# Patient Record
Sex: FEMALE | Race: OTHER | Employment: UNEMPLOYED | URBAN - METROPOLITAN AREA
[De-identification: names, ages, dates, MRNs, and addresses within clinical notes are randomized per-mention and may not be internally consistent; named-entity substitution may affect disease eponyms.]

---

## 2023-03-15 ENCOUNTER — HOSPITAL ENCOUNTER (EMERGENCY)
Facility: HOSPITAL | Age: 38
Discharge: HOME | End: 2023-03-15
Payer: COMMERCIAL

## 2023-03-15 ENCOUNTER — APPOINTMENT (OUTPATIENT)
Dept: ULTRASOUND IMAGING | Facility: HOSPITAL | Age: 38
End: 2023-03-15
Attending: NURSE PRACTITIONER
Payer: COMMERCIAL

## 2023-03-15 VITALS
RESPIRATION RATE: 18 BRPM | HEIGHT: 59 IN | BODY MASS INDEX: 23.18 KG/M2 | HEART RATE: 73 BPM | OXYGEN SATURATION: 98 % | DIASTOLIC BLOOD PRESSURE: 79 MMHG | TEMPERATURE: 98 F | WEIGHT: 115 LBS | SYSTOLIC BLOOD PRESSURE: 110 MMHG

## 2023-03-15 DIAGNOSIS — O20.9 BLEEDING IN EARLY PREGNANCY: Primary | ICD-10-CM

## 2023-03-15 LAB
ANION GAP SERPL CALC-SCNC: 9 MMOL/L (ref 0–18)
B-HCG SERPL-ACNC: 270 MIU/ML
BASOPHILS # BLD AUTO: 0.02 X10(3) UL (ref 0–0.2)
BASOPHILS NFR BLD AUTO: 0.3 %
BILIRUB UR QL: NEGATIVE
BUN BLD-MCNC: 12 MG/DL (ref 7–18)
BUN/CREAT SERPL: 18.8 (ref 10–20)
CALCIUM BLD-MCNC: 9.7 MG/DL (ref 8.5–10.1)
CHLORIDE SERPL-SCNC: 106 MMOL/L (ref 98–112)
CO2 SERPL-SCNC: 25 MMOL/L (ref 21–32)
COLOR UR: YELLOW
CREAT BLD-MCNC: 0.64 MG/DL
DEPRECATED RDW RBC AUTO: 39.5 FL (ref 35.1–46.3)
EOSINOPHIL # BLD AUTO: 0.06 X10(3) UL (ref 0–0.7)
EOSINOPHIL NFR BLD AUTO: 0.8 %
ERYTHROCYTE [DISTWIDTH] IN BLOOD BY AUTOMATED COUNT: 12.3 % (ref 11–15)
GFR SERPLBLD BASED ON 1.73 SQ M-ARVRAT: 117 ML/MIN/1.73M2 (ref 60–?)
GLUCOSE BLD-MCNC: 96 MG/DL (ref 70–99)
GLUCOSE UR-MCNC: NEGATIVE MG/DL
HCT VFR BLD AUTO: 36.4 %
HGB BLD-MCNC: 12.3 G/DL
IMM GRANULOCYTES # BLD AUTO: 0.02 X10(3) UL (ref 0–1)
IMM GRANULOCYTES NFR BLD: 0.3 %
LYMPHOCYTES # BLD AUTO: 2.32 X10(3) UL (ref 1–4)
LYMPHOCYTES NFR BLD AUTO: 29.8 %
MCH RBC QN AUTO: 29.4 PG (ref 26–34)
MCHC RBC AUTO-ENTMCNC: 33.8 G/DL (ref 31–37)
MCV RBC AUTO: 87.1 FL
MONOCYTES # BLD AUTO: 0.68 X10(3) UL (ref 0.1–1)
MONOCYTES NFR BLD AUTO: 8.7 %
NEUTROPHILS # BLD AUTO: 4.69 X10 (3) UL (ref 1.5–7.7)
NEUTROPHILS # BLD AUTO: 4.69 X10(3) UL (ref 1.5–7.7)
NEUTROPHILS NFR BLD AUTO: 60.1 %
NITRITE UR QL STRIP.AUTO: NEGATIVE
OSMOLALITY SERPL CALC.SUM OF ELEC: 290 MOSM/KG (ref 275–295)
PH UR: 6 [PH] (ref 5–8)
PLATELET # BLD AUTO: 270 10(3)UL (ref 150–450)
POTASSIUM SERPL-SCNC: 3.9 MMOL/L (ref 3.5–5.1)
PROT UR-MCNC: NEGATIVE MG/DL
RBC # BLD AUTO: 4.18 X10(6)UL
RH BLOOD TYPE: POSITIVE
SODIUM SERPL-SCNC: 140 MMOL/L (ref 136–145)
SP GR UR STRIP: 1.01 (ref 1–1.03)
UROBILINOGEN UR STRIP-ACNC: <2
VIT C UR-MCNC: NEGATIVE MG/DL
WBC # BLD AUTO: 7.8 X10(3) UL (ref 4–11)

## 2023-03-15 PROCEDURE — 87491 CHLMYD TRACH DNA AMP PROBE: CPT | Performed by: NURSE PRACTITIONER

## 2023-03-15 PROCEDURE — 36415 COLL VENOUS BLD VENIPUNCTURE: CPT

## 2023-03-15 PROCEDURE — 86901 BLOOD TYPING SEROLOGIC RH(D): CPT

## 2023-03-15 PROCEDURE — 84702 CHORIONIC GONADOTROPIN TEST: CPT | Performed by: NURSE PRACTITIONER

## 2023-03-15 PROCEDURE — 76801 OB US < 14 WKS SINGLE FETUS: CPT | Performed by: NURSE PRACTITIONER

## 2023-03-15 PROCEDURE — 99284 EMERGENCY DEPT VISIT MOD MDM: CPT

## 2023-03-15 PROCEDURE — 99285 EMERGENCY DEPT VISIT HI MDM: CPT

## 2023-03-15 PROCEDURE — 80048 BASIC METABOLIC PNL TOTAL CA: CPT

## 2023-03-15 PROCEDURE — 86900 BLOOD TYPING SEROLOGIC ABO: CPT

## 2023-03-15 PROCEDURE — 81001 URINALYSIS AUTO W/SCOPE: CPT

## 2023-03-15 PROCEDURE — 87591 N.GONORRHOEAE DNA AMP PROB: CPT | Performed by: NURSE PRACTITIONER

## 2023-03-15 PROCEDURE — 76817 TRANSVAGINAL US OBSTETRIC: CPT | Performed by: NURSE PRACTITIONER

## 2023-03-15 PROCEDURE — 87086 URINE CULTURE/COLONY COUNT: CPT

## 2023-03-15 PROCEDURE — 85025 COMPLETE CBC W/AUTO DIFF WBC: CPT

## 2023-03-15 RX ORDER — CEPHALEXIN 500 MG/1
500 CAPSULE ORAL 2 TIMES DAILY
Qty: 14 CAPSULE | Refills: 0 | Status: SHIPPED | OUTPATIENT
Start: 2023-03-15 | End: 2023-03-22

## 2023-03-15 NOTE — ED INITIAL ASSESSMENT (HPI)
Pt c/o bleeding that began 3 days ago. Pt took preg test that was + 3 weeks ago. Pt states she began having cramps 3 days ago along with the bleeding. Pt states she had a vaginal infection before getting pregnant that was producing discharge.

## 2023-03-16 LAB
C TRACH DNA SPEC QL NAA+PROBE: NEGATIVE
N GONORRHOEA DNA SPEC QL NAA+PROBE: NEGATIVE

## 2023-03-22 ENCOUNTER — ANESTHESIA EVENT (OUTPATIENT)
Dept: SURGERY | Facility: HOSPITAL | Age: 38
End: 2023-03-22
Payer: COMMERCIAL

## 2023-03-22 ENCOUNTER — APPOINTMENT (OUTPATIENT)
Dept: ULTRASOUND IMAGING | Facility: HOSPITAL | Age: 38
End: 2023-03-22
Attending: EMERGENCY MEDICINE
Payer: COMMERCIAL

## 2023-03-22 ENCOUNTER — ANESTHESIA (OUTPATIENT)
Dept: SURGERY | Facility: HOSPITAL | Age: 38
End: 2023-03-22
Payer: COMMERCIAL

## 2023-03-22 ENCOUNTER — TELEPHONE (OUTPATIENT)
Dept: OBGYN CLINIC | Facility: CLINIC | Age: 38
End: 2023-03-22

## 2023-03-22 ENCOUNTER — HOSPITAL ENCOUNTER (EMERGENCY)
Facility: HOSPITAL | Age: 38
Discharge: HOME | End: 2023-03-22
Attending: EMERGENCY MEDICINE
Payer: COMMERCIAL

## 2023-03-22 VITALS
RESPIRATION RATE: 16 BRPM | BODY MASS INDEX: 26.32 KG/M2 | TEMPERATURE: 98 F | HEART RATE: 91 BPM | DIASTOLIC BLOOD PRESSURE: 61 MMHG | WEIGHT: 122 LBS | SYSTOLIC BLOOD PRESSURE: 107 MMHG | OXYGEN SATURATION: 94 % | HEIGHT: 57 IN

## 2023-03-22 DIAGNOSIS — O00.102 LEFT TUBAL PREGNANCY WITHOUT INTRAUTERINE PREGNANCY: Primary | ICD-10-CM

## 2023-03-22 LAB
ALBUMIN SERPL-MCNC: 4 G/DL (ref 3.4–5)
ALBUMIN/GLOB SERPL: 1.1 {RATIO} (ref 1–2)
ALP LIVER SERPL-CCNC: 65 U/L
ALT SERPL-CCNC: 23 U/L
ANION GAP SERPL CALC-SCNC: 10 MMOL/L (ref 0–18)
ANTIBODY SCREEN: NEGATIVE
AST SERPL-CCNC: 24 U/L (ref 15–37)
B-HCG SERPL-ACNC: 800 MIU/ML
B-HCG UR QL: POSITIVE
BASOPHILS # BLD AUTO: 0.02 X10(3) UL (ref 0–0.2)
BASOPHILS NFR BLD AUTO: 0.2 %
BILIRUB SERPL-MCNC: 0.3 MG/DL (ref 0.1–2)
BILIRUB UR QL: NEGATIVE
BUN BLD-MCNC: 15 MG/DL (ref 7–18)
BUN/CREAT SERPL: 23.4 (ref 10–20)
CALCIUM BLD-MCNC: 9 MG/DL (ref 8.5–10.1)
CHLORIDE SERPL-SCNC: 105 MMOL/L (ref 98–112)
CO2 SERPL-SCNC: 24 MMOL/L (ref 21–32)
COLOR UR: YELLOW
CREAT BLD-MCNC: 0.64 MG/DL
DEPRECATED RDW RBC AUTO: 40.3 FL (ref 35.1–46.3)
EOSINOPHIL # BLD AUTO: 0.01 X10(3) UL (ref 0–0.7)
EOSINOPHIL NFR BLD AUTO: 0.1 %
ERYTHROCYTE [DISTWIDTH] IN BLOOD BY AUTOMATED COUNT: 12.4 % (ref 11–15)
GFR SERPLBLD BASED ON 1.73 SQ M-ARVRAT: 117 ML/MIN/1.73M2 (ref 60–?)
GLOBULIN PLAS-MCNC: 3.6 G/DL (ref 2.8–4.4)
GLUCOSE BLD-MCNC: 111 MG/DL (ref 70–99)
GLUCOSE UR-MCNC: NEGATIVE MG/DL
HCT VFR BLD AUTO: 33.2 %
HGB BLD-MCNC: 10.9 G/DL
HGB UR QL STRIP.AUTO: NEGATIVE
IMM GRANULOCYTES # BLD AUTO: 0.05 X10(3) UL (ref 0–1)
IMM GRANULOCYTES NFR BLD: 0.5 %
LYMPHOCYTES # BLD AUTO: 0.66 X10(3) UL (ref 1–4)
LYMPHOCYTES NFR BLD AUTO: 6.6 %
MCH RBC QN AUTO: 29.3 PG (ref 26–34)
MCHC RBC AUTO-ENTMCNC: 32.8 G/DL (ref 31–37)
MCV RBC AUTO: 89.2 FL
MONOCYTES # BLD AUTO: 0.35 X10(3) UL (ref 0.1–1)
MONOCYTES NFR BLD AUTO: 3.5 %
NEUTROPHILS # BLD AUTO: 8.88 X10 (3) UL (ref 1.5–7.7)
NEUTROPHILS # BLD AUTO: 8.88 X10(3) UL (ref 1.5–7.7)
NEUTROPHILS NFR BLD AUTO: 89.1 %
NITRITE UR QL STRIP.AUTO: NEGATIVE
OSMOLALITY SERPL CALC.SUM OF ELEC: 290 MOSM/KG (ref 275–295)
PH UR: 5 [PH] (ref 5–8)
PLATELET # BLD AUTO: 246 10(3)UL (ref 150–450)
POTASSIUM SERPL-SCNC: 3.8 MMOL/L (ref 3.5–5.1)
PROT SERPL-MCNC: 7.6 G/DL (ref 6.4–8.2)
PROT UR-MCNC: NEGATIVE MG/DL
RBC # BLD AUTO: 3.72 X10(6)UL
RH BLOOD TYPE: POSITIVE
RH BLOOD TYPE: POSITIVE
SARS-COV-2 RNA RESP QL NAA+PROBE: NOT DETECTED
SODIUM SERPL-SCNC: 139 MMOL/L (ref 136–145)
SP GR UR STRIP: 1.03 (ref 1–1.03)
UROBILINOGEN UR STRIP-ACNC: <2
VIT C UR-MCNC: 40 MG/DL
WBC # BLD AUTO: 10 X10(3) UL (ref 4–11)

## 2023-03-22 PROCEDURE — 10T24ZZ RESECTION OF PRODUCTS OF CONCEPTION, ECTOPIC, PERCUTANEOUS ENDOSCOPIC APPROACH: ICD-10-PCS | Performed by: OBSTETRICS & GYNECOLOGY

## 2023-03-22 PROCEDURE — 76817 TRANSVAGINAL US OBSTETRIC: CPT | Performed by: EMERGENCY MEDICINE

## 2023-03-22 PROCEDURE — 59150 TREAT ECTOPIC PREGNANCY: CPT | Performed by: OBSTETRICS & GYNECOLOGY

## 2023-03-22 PROCEDURE — 0UT54ZZ RESECTION OF RIGHT FALLOPIAN TUBE, PERCUTANEOUS ENDOSCOPIC APPROACH: ICD-10-PCS | Performed by: OBSTETRICS & GYNECOLOGY

## 2023-03-22 PROCEDURE — 76801 OB US < 14 WKS SINGLE FETUS: CPT | Performed by: EMERGENCY MEDICINE

## 2023-03-22 RX ORDER — HYDROMORPHONE HYDROCHLORIDE 1 MG/ML
0.4 INJECTION, SOLUTION INTRAMUSCULAR; INTRAVENOUS; SUBCUTANEOUS EVERY 5 MIN PRN
Status: DISCONTINUED | OUTPATIENT
Start: 2023-03-22 | End: 2023-03-22

## 2023-03-22 RX ORDER — MAGNESIUM HYDROXIDE 1200 MG/15ML
LIQUID ORAL CONTINUOUS PRN
Status: COMPLETED | OUTPATIENT
Start: 2023-03-22 | End: 2023-03-22

## 2023-03-22 RX ORDER — ROCURONIUM BROMIDE 10 MG/ML
INJECTION, SOLUTION INTRAVENOUS AS NEEDED
Status: DISCONTINUED | OUTPATIENT
Start: 2023-03-22 | End: 2023-03-22 | Stop reason: SURG

## 2023-03-22 RX ORDER — HYDROMORPHONE HYDROCHLORIDE 1 MG/ML
0.6 INJECTION, SOLUTION INTRAMUSCULAR; INTRAVENOUS; SUBCUTANEOUS EVERY 5 MIN PRN
Status: DISCONTINUED | OUTPATIENT
Start: 2023-03-22 | End: 2023-03-22

## 2023-03-22 RX ORDER — LIDOCAINE HYDROCHLORIDE 10 MG/ML
INJECTION, SOLUTION EPIDURAL; INFILTRATION; INTRACAUDAL; PERINEURAL AS NEEDED
Status: DISCONTINUED | OUTPATIENT
Start: 2023-03-22 | End: 2023-03-22 | Stop reason: SURG

## 2023-03-22 RX ORDER — NEOSTIGMINE METHYLSULFATE 1 MG/ML
INJECTION, SOLUTION INTRAVENOUS AS NEEDED
Status: DISCONTINUED | OUTPATIENT
Start: 2023-03-22 | End: 2023-03-22 | Stop reason: SURG

## 2023-03-22 RX ORDER — MORPHINE SULFATE 4 MG/ML
4 INJECTION, SOLUTION INTRAMUSCULAR; INTRAVENOUS ONCE
Status: COMPLETED | OUTPATIENT
Start: 2023-03-22 | End: 2023-03-22

## 2023-03-22 RX ORDER — IBUPROFEN 600 MG/1
600 TABLET ORAL EVERY 6 HOURS PRN
Qty: 30 TABLET | Refills: 0 | Status: SHIPPED | OUTPATIENT
Start: 2023-03-22

## 2023-03-22 RX ORDER — HYDROMORPHONE HYDROCHLORIDE 1 MG/ML
0.2 INJECTION, SOLUTION INTRAMUSCULAR; INTRAVENOUS; SUBCUTANEOUS EVERY 5 MIN PRN
Status: DISCONTINUED | OUTPATIENT
Start: 2023-03-22 | End: 2023-03-22

## 2023-03-22 RX ORDER — MORPHINE SULFATE 4 MG/ML
4 INJECTION, SOLUTION INTRAMUSCULAR; INTRAVENOUS EVERY 10 MIN PRN
Status: DISCONTINUED | OUTPATIENT
Start: 2023-03-22 | End: 2023-03-22

## 2023-03-22 RX ORDER — HYDROCODONE BITARTRATE AND ACETAMINOPHEN 5; 325 MG/1; MG/1
1-2 TABLET ORAL EVERY 4 HOURS PRN
Qty: 12 TABLET | Refills: 0 | Status: SHIPPED | OUTPATIENT
Start: 2023-03-22 | End: 2023-03-27

## 2023-03-22 RX ORDER — MORPHINE SULFATE 4 MG/ML
2 INJECTION, SOLUTION INTRAMUSCULAR; INTRAVENOUS EVERY 10 MIN PRN
Status: DISCONTINUED | OUTPATIENT
Start: 2023-03-22 | End: 2023-03-22

## 2023-03-22 RX ORDER — MORPHINE SULFATE 10 MG/ML
6 INJECTION, SOLUTION INTRAMUSCULAR; INTRAVENOUS EVERY 10 MIN PRN
Status: DISCONTINUED | OUTPATIENT
Start: 2023-03-22 | End: 2023-03-22

## 2023-03-22 RX ORDER — BUPIVACAINE HYDROCHLORIDE 2.5 MG/ML
INJECTION, SOLUTION EPIDURAL; INFILTRATION; INTRACAUDAL AS NEEDED
Status: DISCONTINUED | OUTPATIENT
Start: 2023-03-22 | End: 2023-03-22 | Stop reason: HOSPADM

## 2023-03-22 RX ORDER — SODIUM CHLORIDE 9 MG/ML
INJECTION, SOLUTION INTRAVENOUS CONTINUOUS PRN
Status: DISCONTINUED | OUTPATIENT
Start: 2023-03-22 | End: 2023-03-22 | Stop reason: SURG

## 2023-03-22 RX ORDER — ONDANSETRON 2 MG/ML
INJECTION INTRAMUSCULAR; INTRAVENOUS AS NEEDED
Status: DISCONTINUED | OUTPATIENT
Start: 2023-03-22 | End: 2023-03-22 | Stop reason: SURG

## 2023-03-22 RX ORDER — IBUPROFEN 600 MG/1
600 TABLET ORAL ONCE
Status: COMPLETED | OUTPATIENT
Start: 2023-03-22 | End: 2023-03-22

## 2023-03-22 RX ORDER — HYDROCODONE BITARTRATE AND ACETAMINOPHEN 5; 325 MG/1; MG/1
1 TABLET ORAL ONCE
Status: DISCONTINUED | OUTPATIENT
Start: 2023-03-22 | End: 2023-03-22

## 2023-03-22 RX ORDER — DEXAMETHASONE SODIUM PHOSPHATE 4 MG/ML
VIAL (ML) INJECTION AS NEEDED
Status: DISCONTINUED | OUTPATIENT
Start: 2023-03-22 | End: 2023-03-22 | Stop reason: SURG

## 2023-03-22 RX ORDER — GLYCOPYRROLATE 0.2 MG/ML
INJECTION, SOLUTION INTRAMUSCULAR; INTRAVENOUS AS NEEDED
Status: DISCONTINUED | OUTPATIENT
Start: 2023-03-22 | End: 2023-03-22 | Stop reason: SURG

## 2023-03-22 RX ORDER — SODIUM CHLORIDE, SODIUM LACTATE, POTASSIUM CHLORIDE, CALCIUM CHLORIDE 600; 310; 30; 20 MG/100ML; MG/100ML; MG/100ML; MG/100ML
INJECTION, SOLUTION INTRAVENOUS CONTINUOUS
Status: DISCONTINUED | OUTPATIENT
Start: 2023-03-22 | End: 2023-03-22

## 2023-03-22 RX ORDER — SODIUM CHLORIDE 9 MG/ML
INJECTION, SOLUTION INTRAVENOUS CONTINUOUS
OUTPATIENT
Start: 2023-03-22 | End: 2023-03-22

## 2023-03-22 RX ORDER — NALOXONE HYDROCHLORIDE 0.4 MG/ML
80 INJECTION, SOLUTION INTRAMUSCULAR; INTRAVENOUS; SUBCUTANEOUS AS NEEDED
Status: DISCONTINUED | OUTPATIENT
Start: 2023-03-22 | End: 2023-03-22

## 2023-03-22 RX ADMIN — DEXAMETHASONE SODIUM PHOSPHATE 4 MG: 4 MG/ML VIAL (ML) INJECTION at 16:19:00

## 2023-03-22 RX ADMIN — GLYCOPYRROLATE 0.6 MG: 0.2 INJECTION, SOLUTION INTRAMUSCULAR; INTRAVENOUS at 17:45:00

## 2023-03-22 RX ADMIN — ROCURONIUM BROMIDE 30 MG: 10 INJECTION, SOLUTION INTRAVENOUS at 16:13:00

## 2023-03-22 RX ADMIN — NEOSTIGMINE METHYLSULFATE 3 MG: 1 INJECTION, SOLUTION INTRAVENOUS at 17:45:00

## 2023-03-22 RX ADMIN — SODIUM CHLORIDE: 9 INJECTION, SOLUTION INTRAVENOUS at 16:13:00

## 2023-03-22 RX ADMIN — ONDANSETRON 4 MG: 2 INJECTION INTRAMUSCULAR; INTRAVENOUS at 16:19:00

## 2023-03-22 RX ADMIN — LIDOCAINE HYDROCHLORIDE 50 MG: 10 INJECTION, SOLUTION EPIDURAL; INFILTRATION; INTRACAUDAL; PERINEURAL at 16:13:00

## 2023-03-22 NOTE — ED QUICK NOTES
Report given to OR RN. Pt instructed to take off all jewelry, clothes including underwear. IV bolus still infusing.

## 2023-03-22 NOTE — OPERATIVE REPORT
Laparoscopic Removal Ectopic Pregnancy Operative Note:      Preoperative Diagnosis: Suspected left adnexal ectopic pregnancy  Postoperative Diagnosis: Same    Procedure: Laparoscopic Removal Ectopic pregnancy with partial left salpingectomy and right salpingectomy    Surgeon: Nereida Dill MD  Assistant: Stacia Lyle MD; Tanesha Bailey MD    Anesthesia: General  Complications: None  EBL:  mL  IV Fluids: 1,000 mL    Findings: Approximately 300 mL blood and clots in the pelvis upon entering. Area at left cornua that was suspicious for ectopic and removed. Right tube with appearance of hydrosalpinx, removed. Procedure:  After informed consent, the patient was taken to the operating room and placed under general anesthesia. The patient was then placed in dorsal lithotomy position and prepared and draped in normal sterile fashion. A time out was held and patient and procedure verified. Attention was then turned to the patient's abdomen where a 5mm skin incision was made in the superior aspect of the umbilical fold. A veress needle was used to enter the abdominal cavity and insufflate the abdomen. A 5mm trocar was them placed through the incision. A survey of the patient's pelvis and abdomen revealed blood in the pelvis with clots measuring approximately 300 mL. There was no definitive source of the ectopic pregnancy. The left fallopian tube appear to be adhered to the side wall and extending retroperitoneal, with portion of tube and possible edge of cornua a suspected source of the ectopic. Right fallopian tube appeared as hydrosalpinx and also suspicious appearing as the possible source of the ectopic. Two addition 5mm ports were placed in the left and right lower quadrants. The left segment of the tube near the cornua was cauterized and cut transecting close to the cornua and removed through the port site. The right tube was grasped and elevated.  The Enseal device was used taking down the tubo-ovarian ligament with special attention paid to the IP to not interrupt blood supply. The tube was transected at the cornua and removed through a side port. Both specimen were sent for frozen section to confirm chorionic villi; frozen section was negative. The pelvis was then irrigated until all clots were removed. A final survey of the pelvis ensured hemostasis. Francisco Javier was added to both adnexa for additional hemostasis. The instruments and trocars were then removed from the pelvis and the abdomen was evacuated of pneumoperitoneum. The skin incisions were closed in subcuticular manner with 4-0 Vicryl. Each incision was clean and covered with Tegaderm. The patient tolerated the procedure well and was taken to recovery in stable condition. The sponge, needle and instrument counts were correct. Plan will be to trend weekly HCG to ensure level decreases since there was no definitive ectopic pregnancy visualized.

## 2023-03-22 NOTE — BRIEF OP NOTE
Pre-Operative Diagnosis: ectopic pregnancy     Post-Operative Diagnosis: ectopic pregnancy      Procedure Performed:   LAPAROSCOPIC TUBAL ECTOPIC EXCISION, right salpingectomy    Surgeon(s) and Role:     Mendel Garcia MD - Primary     * Matthew Tanner MD - Assisting Surgeon     * Rickey Ireland MD - Assisting Surgeon    Assistant(s):        Surgical Findings: Approximately 300 mL blood and clots noted in pelvis upon entering. Partial left salpingectomy performed for suspected location of ectopic. Right hydrosalpinx vs source of ectopic noted.       Specimen: Left partial tube; right fallopian tube; suction contents     Estimated Blood Loss: No data recorded      Jadyn Farrell MD  3/22/2023

## 2023-03-22 NOTE — ED INITIAL ASSESSMENT (HPI)
Pt about 8 weeks pregnant, c/o lower abdominal pain/cramping. Denies bleeding.  LMP;: 2/4/2023 Pt had left fallopian tube/ovary removed 2013

## 2023-03-22 NOTE — ANESTHESIA PROCEDURE NOTES
Airway  Date/Time: 3/22/2023 4:15 PM  Urgency: Elective    Airway not difficult    General Information and Staff    Patient location during procedure: OR  Anesthesiologist: Yobany Martini MD  Performed: anesthesiologist   Performed by: Yobany Martini MD  Authorized by: Yobany Martini MD      Indications and Patient Condition  Indications for airway management: anesthesia  Sedation level: deep  Preoxygenated: yes  Patient position: sniffing  Mask difficulty assessment: 1 - vent by mask    Final Airway Details  Final airway type: endotracheal airway      Successful airway: ETT  Cuffed: yes   Successful intubation technique: direct laryngoscopy  Endotracheal tube insertion site: oral  Blade: Beatrice  Blade size: #3  ETT size (mm): 7.0    Cormack-Lehane Classification: grade I - full view of glottis  Placement verified by: chest auscultation and capnometry   Measured from: gums  ETT to gums (cm): 20  Number of attempts at approach: 1

## 2023-03-22 NOTE — ED QUICK NOTES
Orders for admission, patient is aware of plan and ready to go upstairs. Any questions, please call ED RN alyssa at extension [phone].      Patient Covid vaccination status: Unvaccinated     COVID Test Ordered in ED: Rapid SARS-CoV-2 by PCR    COVID Suspicion at Admission: N/A    Running Infusions:      Mental Status/LOC at time of transport: AXOX4    Other pertinent information:   CIWA score: N/A   NIH score:  N/A

## 2023-03-23 LAB
C TRACH DNA SPEC QL NAA+PROBE: NEGATIVE
N GONORRHOEA DNA SPEC QL NAA+PROBE: NEGATIVE

## 2023-03-23 NOTE — TELEPHONE ENCOUNTER
This patient underwent laparoscopic removal of ectopic pregnancy 3/22 and is previously not a patient of the clinic. She needs a follow-up appointment with me in 2 weeks. Can you please be sure she is scheduled?  Thank you

## 2023-03-23 NOTE — TELEPHONE ENCOUNTER
PSR please call pt and assist with scheduling follow up appt with SAGEN in 2 weeks. Okay to use any 10 min slot. Thank you.

## 2023-03-24 LAB
GENITAL VAGINOSIS SCREEN: NEGATIVE
TRICHOMONAS SCREEN: NEGATIVE

## 2023-03-25 ENCOUNTER — HOSPITAL ENCOUNTER (EMERGENCY)
Facility: HOSPITAL | Age: 38
Discharge: HOME | End: 2023-03-25
Attending: STUDENT IN AN ORGANIZED HEALTH CARE EDUCATION/TRAINING PROGRAM
Payer: COMMERCIAL

## 2023-03-25 ENCOUNTER — APPOINTMENT (OUTPATIENT)
Dept: ULTRASOUND IMAGING | Facility: HOSPITAL | Age: 38
End: 2023-03-25
Attending: STUDENT IN AN ORGANIZED HEALTH CARE EDUCATION/TRAINING PROGRAM
Payer: COMMERCIAL

## 2023-03-25 VITALS
WEIGHT: 122 LBS | SYSTOLIC BLOOD PRESSURE: 117 MMHG | HEART RATE: 64 BPM | TEMPERATURE: 98 F | DIASTOLIC BLOOD PRESSURE: 79 MMHG | BODY MASS INDEX: 23.95 KG/M2 | RESPIRATION RATE: 18 BRPM | HEIGHT: 60 IN | OXYGEN SATURATION: 98 %

## 2023-03-25 DIAGNOSIS — N93.9 VAGINAL BLEEDING: Primary | ICD-10-CM

## 2023-03-25 LAB
ANION GAP SERPL CALC-SCNC: 8 MMOL/L (ref 0–18)
B-HCG SERPL-ACNC: 123 MIU/ML
BASOPHILS # BLD AUTO: 0.02 X10(3) UL (ref 0–0.2)
BASOPHILS NFR BLD AUTO: 0.3 %
BILIRUB UR QL: NEGATIVE
BUN BLD-MCNC: 6 MG/DL (ref 7–18)
BUN/CREAT SERPL: 9.8 (ref 10–20)
CALCIUM BLD-MCNC: 9 MG/DL (ref 8.5–10.1)
CHLORIDE SERPL-SCNC: 109 MMOL/L (ref 98–112)
CLARITY UR: CLEAR
CO2 SERPL-SCNC: 26 MMOL/L (ref 21–32)
COLOR UR: COLORLESS
CREAT BLD-MCNC: 0.61 MG/DL
DEPRECATED RDW RBC AUTO: 39.7 FL (ref 35.1–46.3)
EOSINOPHIL # BLD AUTO: 0.05 X10(3) UL (ref 0–0.7)
EOSINOPHIL NFR BLD AUTO: 0.8 %
ERYTHROCYTE [DISTWIDTH] IN BLOOD BY AUTOMATED COUNT: 12.4 % (ref 11–15)
GFR SERPLBLD BASED ON 1.73 SQ M-ARVRAT: 118 ML/MIN/1.73M2 (ref 60–?)
GLUCOSE BLD-MCNC: 108 MG/DL (ref 70–99)
GLUCOSE BLDC GLUCOMTR-MCNC: 118 MG/DL (ref 70–99)
GLUCOSE UR-MCNC: NORMAL MG/DL
HCT VFR BLD AUTO: 30.3 %
HGB BLD-MCNC: 10.2 G/DL
IMM GRANULOCYTES # BLD AUTO: 0.01 X10(3) UL (ref 0–1)
IMM GRANULOCYTES NFR BLD: 0.2 %
KETONES UR-MCNC: NEGATIVE MG/DL
LEUKOCYTE ESTERASE UR QL STRIP.AUTO: 75
LYMPHOCYTES # BLD AUTO: 1.74 X10(3) UL (ref 1–4)
LYMPHOCYTES NFR BLD AUTO: 26.6 %
MCH RBC QN AUTO: 29.6 PG (ref 26–34)
MCHC RBC AUTO-ENTMCNC: 33.7 G/DL (ref 31–37)
MCV RBC AUTO: 87.8 FL
MONOCYTES # BLD AUTO: 0.62 X10(3) UL (ref 0.1–1)
MONOCYTES NFR BLD AUTO: 9.5 %
NEUTROPHILS # BLD AUTO: 4.09 X10 (3) UL (ref 1.5–7.7)
NEUTROPHILS # BLD AUTO: 4.09 X10(3) UL (ref 1.5–7.7)
NEUTROPHILS NFR BLD AUTO: 62.6 %
NITRITE UR QL STRIP.AUTO: NEGATIVE
OSMOLALITY SERPL CALC.SUM OF ELEC: 294 MOSM/KG (ref 275–295)
PH UR: 7 [PH] (ref 5–8)
PLATELET # BLD AUTO: 280 10(3)UL (ref 150–450)
POTASSIUM SERPL-SCNC: 3.7 MMOL/L (ref 3.5–5.1)
PROT UR-MCNC: 50 MG/DL
RBC # BLD AUTO: 3.45 X10(6)UL
RBC #/AREA URNS AUTO: >10 /HPF
SODIUM SERPL-SCNC: 143 MMOL/L (ref 136–145)
SP GR UR STRIP: <1.005 (ref 1–1.03)
UROBILINOGEN UR STRIP-ACNC: NORMAL
WBC # BLD AUTO: 6.5 X10(3) UL (ref 4–11)

## 2023-03-25 PROCEDURE — 99285 EMERGENCY DEPT VISIT HI MDM: CPT

## 2023-03-25 PROCEDURE — 82962 GLUCOSE BLOOD TEST: CPT

## 2023-03-25 PROCEDURE — 85025 COMPLETE CBC W/AUTO DIFF WBC: CPT | Performed by: STUDENT IN AN ORGANIZED HEALTH CARE EDUCATION/TRAINING PROGRAM

## 2023-03-25 PROCEDURE — 36415 COLL VENOUS BLD VENIPUNCTURE: CPT

## 2023-03-25 PROCEDURE — 87086 URINE CULTURE/COLONY COUNT: CPT | Performed by: STUDENT IN AN ORGANIZED HEALTH CARE EDUCATION/TRAINING PROGRAM

## 2023-03-25 PROCEDURE — 93975 VASCULAR STUDY: CPT | Performed by: STUDENT IN AN ORGANIZED HEALTH CARE EDUCATION/TRAINING PROGRAM

## 2023-03-25 PROCEDURE — 80048 BASIC METABOLIC PNL TOTAL CA: CPT | Performed by: STUDENT IN AN ORGANIZED HEALTH CARE EDUCATION/TRAINING PROGRAM

## 2023-03-25 PROCEDURE — 93005 ELECTROCARDIOGRAM TRACING: CPT

## 2023-03-25 PROCEDURE — 76830 TRANSVAGINAL US NON-OB: CPT | Performed by: STUDENT IN AN ORGANIZED HEALTH CARE EDUCATION/TRAINING PROGRAM

## 2023-03-25 PROCEDURE — 81001 URINALYSIS AUTO W/SCOPE: CPT | Performed by: STUDENT IN AN ORGANIZED HEALTH CARE EDUCATION/TRAINING PROGRAM

## 2023-03-25 PROCEDURE — 93010 ELECTROCARDIOGRAM REPORT: CPT

## 2023-03-25 PROCEDURE — 76856 US EXAM PELVIC COMPLETE: CPT | Performed by: STUDENT IN AN ORGANIZED HEALTH CARE EDUCATION/TRAINING PROGRAM

## 2023-03-25 PROCEDURE — 84702 CHORIONIC GONADOTROPIN TEST: CPT | Performed by: STUDENT IN AN ORGANIZED HEALTH CARE EDUCATION/TRAINING PROGRAM

## 2023-03-25 RX ORDER — HYDROCODONE BITARTRATE AND ACETAMINOPHEN 5; 325 MG/1; MG/1
1 TABLET ORAL ONCE
Status: COMPLETED | OUTPATIENT
Start: 2023-03-25 | End: 2023-03-25

## 2023-03-25 NOTE — ED INITIAL ASSESSMENT (HPI)
Patient had fallopian tube removed 2 days ago. Patient presents with vaginal bleeding & blood clots which is increasing. Patient changed 4 pads in 4hr. Patient complains of dizziness. Denies chest pain.

## 2023-03-25 NOTE — ED QUICK NOTES
Patient to ED with spouse for complaints of lower abd pain xs yesterday, vaginal bleeding and dizziness xs waking up today. Pt reports saturating 4 pads in past 4 hours with dark red blood and large amt of clots. Endorses she had ectopic pregnancy at 8 wk gestation; surgery ~3 days prior to remove left fallopian tube. Also endorses when surgery was in process, they also noted right fallopian tube to be 'filled with clots' and removed right tube as well. No bleeding until today, pain started yesterday. PIV inserted, labs drawn and sent. Spouse at bedside. Call light within reach. Pt normotensive at this time. On nibp and spo2 monitors.

## 2023-03-26 LAB
ATRIAL RATE: 75 BPM
P AXIS: 29 DEGREES
P-R INTERVAL: 140 MS
Q-T INTERVAL: 386 MS
QRS DURATION: 80 MS
QTC CALCULATION (BEZET): 431 MS
R AXIS: 24 DEGREES
T AXIS: 32 DEGREES
VENTRICULAR RATE: 75 BPM

## 2023-03-26 NOTE — DISCHARGE INSTRUCTIONS
Return to the ER for worsening pain vaginal bleeding, lightheadedness, or any new concerns.   Thanks we hope you feel better soon

## 2023-04-10 ENCOUNTER — OFFICE VISIT (OUTPATIENT)
Dept: OBGYN CLINIC | Facility: CLINIC | Age: 38
End: 2023-04-10

## 2023-04-10 ENCOUNTER — LAB ENCOUNTER (OUTPATIENT)
Dept: LAB | Facility: HOSPITAL | Age: 38
End: 2023-04-10
Attending: OBSTETRICS & GYNECOLOGY
Payer: COMMERCIAL

## 2023-04-10 VITALS
HEART RATE: 70 BPM | SYSTOLIC BLOOD PRESSURE: 105 MMHG | DIASTOLIC BLOOD PRESSURE: 68 MMHG | BODY MASS INDEX: 23 KG/M2 | WEIGHT: 120 LBS

## 2023-04-10 DIAGNOSIS — Z87.59 S/P ECTOPIC PREGNANCY: Primary | ICD-10-CM

## 2023-04-10 DIAGNOSIS — O00.102 LEFT TUBAL PREGNANCY WITHOUT INTRAUTERINE PREGNANCY: ICD-10-CM

## 2023-04-10 DIAGNOSIS — Z98.890 S/P LAPAROSCOPY: ICD-10-CM

## 2023-04-10 LAB — B-HCG SERPL-ACNC: 5 MIU/ML

## 2023-04-10 PROCEDURE — 84702 CHORIONIC GONADOTROPIN TEST: CPT

## 2023-04-10 PROCEDURE — 36415 COLL VENOUS BLD VENIPUNCTURE: CPT

## 2024-02-02 ENCOUNTER — HOSPITAL ENCOUNTER (EMERGENCY)
Facility: HOSPITAL | Age: 39
Discharge: HOME | End: 2024-02-02
Attending: STUDENT IN AN ORGANIZED HEALTH CARE EDUCATION/TRAINING PROGRAM
Payer: MEDICAID

## 2024-02-02 VITALS
TEMPERATURE: 100 F | DIASTOLIC BLOOD PRESSURE: 78 MMHG | HEART RATE: 84 BPM | RESPIRATION RATE: 18 BRPM | OXYGEN SATURATION: 99 % | SYSTOLIC BLOOD PRESSURE: 118 MMHG

## 2024-02-02 DIAGNOSIS — K64.9 BLEEDING HEMORRHOID: ICD-10-CM

## 2024-02-02 DIAGNOSIS — K62.5 BRIGHT RED BLOOD PER RECTUM: Primary | ICD-10-CM

## 2024-02-02 LAB
ALBUMIN SERPL-MCNC: 4.5 G/DL (ref 3.2–4.8)
ALBUMIN/GLOB SERPL: 1.4 {RATIO} (ref 1–2)
ALP LIVER SERPL-CCNC: 79 U/L
ALT SERPL-CCNC: 28 U/L
ANION GAP SERPL CALC-SCNC: 9 MMOL/L (ref 0–18)
AST SERPL-CCNC: 24 U/L (ref ?–34)
BASOPHILS # BLD AUTO: 0.03 X10(3) UL (ref 0–0.2)
BASOPHILS NFR BLD AUTO: 0.4 %
BILIRUB SERPL-MCNC: 0.7 MG/DL (ref 0.3–1.2)
BUN BLD-MCNC: 13 MG/DL (ref 9–23)
BUN/CREAT SERPL: 19.7 (ref 10–20)
CALCIUM BLD-MCNC: 9.7 MG/DL (ref 8.7–10.4)
CHLORIDE SERPL-SCNC: 107 MMOL/L (ref 98–112)
CO2 SERPL-SCNC: 24 MMOL/L (ref 21–32)
CREAT BLD-MCNC: 0.66 MG/DL
DEPRECATED RDW RBC AUTO: 38.9 FL (ref 35.1–46.3)
EGFRCR SERPLBLD CKD-EPI 2021: 115 ML/MIN/1.73M2 (ref 60–?)
EOSINOPHIL # BLD AUTO: 0.05 X10(3) UL (ref 0–0.7)
EOSINOPHIL NFR BLD AUTO: 0.7 %
ERYTHROCYTE [DISTWIDTH] IN BLOOD BY AUTOMATED COUNT: 11.9 % (ref 11–15)
GLOBULIN PLAS-MCNC: 3.2 G/DL (ref 2.8–4.4)
GLUCOSE BLD-MCNC: 86 MG/DL (ref 70–99)
HCT VFR BLD AUTO: 39.3 %
HGB BLD-MCNC: 13.3 G/DL
IMM GRANULOCYTES # BLD AUTO: 0.03 X10(3) UL (ref 0–1)
IMM GRANULOCYTES NFR BLD: 0.4 %
LIPASE SERPL-CCNC: 31 U/L (ref 13–75)
LYMPHOCYTES # BLD AUTO: 1.45 X10(3) UL (ref 1–4)
LYMPHOCYTES NFR BLD AUTO: 20.6 %
MCH RBC QN AUTO: 30.1 PG (ref 26–34)
MCHC RBC AUTO-ENTMCNC: 33.8 G/DL (ref 31–37)
MCV RBC AUTO: 88.9 FL
MONOCYTES # BLD AUTO: 0.36 X10(3) UL (ref 0.1–1)
MONOCYTES NFR BLD AUTO: 5.1 %
NEUTROPHILS # BLD AUTO: 5.11 X10 (3) UL (ref 1.5–7.7)
NEUTROPHILS # BLD AUTO: 5.11 X10(3) UL (ref 1.5–7.7)
NEUTROPHILS NFR BLD AUTO: 72.8 %
OSMOLALITY SERPL CALC.SUM OF ELEC: 289 MOSM/KG (ref 275–295)
PLATELET # BLD AUTO: 271 10(3)UL (ref 150–450)
POTASSIUM SERPL-SCNC: 3.7 MMOL/L (ref 3.5–5.1)
PROT SERPL-MCNC: 7.7 G/DL (ref 5.7–8.2)
RBC # BLD AUTO: 4.42 X10(6)UL
SODIUM SERPL-SCNC: 140 MMOL/L (ref 136–145)
WBC # BLD AUTO: 7 X10(3) UL (ref 4–11)

## 2024-02-02 PROCEDURE — 80053 COMPREHEN METABOLIC PANEL: CPT | Performed by: STUDENT IN AN ORGANIZED HEALTH CARE EDUCATION/TRAINING PROGRAM

## 2024-02-02 PROCEDURE — 99284 EMERGENCY DEPT VISIT MOD MDM: CPT

## 2024-02-02 PROCEDURE — 85025 COMPLETE CBC W/AUTO DIFF WBC: CPT | Performed by: STUDENT IN AN ORGANIZED HEALTH CARE EDUCATION/TRAINING PROGRAM

## 2024-02-02 PROCEDURE — 36415 COLL VENOUS BLD VENIPUNCTURE: CPT

## 2024-02-02 PROCEDURE — 83690 ASSAY OF LIPASE: CPT | Performed by: STUDENT IN AN ORGANIZED HEALTH CARE EDUCATION/TRAINING PROGRAM

## 2024-02-02 PROCEDURE — 99283 EMERGENCY DEPT VISIT LOW MDM: CPT

## 2024-02-02 RX ORDER — OMEPRAZOLE 20 MG/1
20 CAPSULE, DELAYED RELEASE ORAL
COMMUNITY

## 2024-02-02 RX ORDER — POLYETHYLENE GLYCOL 3350 17 G/17G
17 POWDER, FOR SOLUTION ORAL DAILY PRN
Qty: 12 EACH | Refills: 0 | Status: SHIPPED | OUTPATIENT
Start: 2024-02-02 | End: 2024-03-03

## 2024-02-02 RX ORDER — HYDROCORTISONE ACETATE 25 MG/1
25 SUPPOSITORY RECTAL 2 TIMES DAILY PRN
Qty: 12 SUPPOSITORY | Refills: 0 | Status: SHIPPED | OUTPATIENT
Start: 2024-02-02 | End: 2024-03-03

## 2024-02-03 NOTE — ED INITIAL ASSESSMENT (HPI)
Patient presents to ed with dark stools and bright red blood from rectum x 3 days. Pt states she had an endoscopy and colonoscopy in the last year for gastritis and anemia.

## 2024-02-03 NOTE — ED PROVIDER NOTES
Patient Seen in: [facility] Emergency Department      History     Chief Complaint   Patient presents with    GI Bleeding     Stated Complaint: GI Bleed    Subjective:   HPI        38-year-old female with history of gastritis, presenting for evaluation of gastrointestinal bleeding.  She describes 3 days of episodes of bright red blood per rectum.  No chest pain difficulty breathing lightheadedness or dizziness.  No blood thinner use.  Has had endoscopy and colonoscopy within the last year.  Also notes months of epigastric pain worse with eating, already on a PPI.  No fevers chills night sweats no urinary symptoms, no melena.  No significant EtOH use history.  Surgical history is remarkable for diagnostic laparoscopy and salpingectomy for ectopic pregnancy    Objective:   Past Medical History:   Diagnosis Date    Ectopic pregnancy     Gastritis               Past Surgical History:   Procedure Laterality Date    HC  SECTION LEVEL I      LAPAROSCOPY,DIAGNOSTIC Left     Pt reports removal of L tube and ovary                Social History     Socioeconomic History    Marital status: Single   Tobacco Use    Smoking status: Never     Passive exposure: Never    Smokeless tobacco: Never   Substance and Sexual Activity    Alcohol use: Not Currently    Drug use: Never              Review of Systems    Positive for stated complaint: GI Bleed  Other systems are as noted in HPI.  Constitutional and vital signs reviewed.      All other systems reviewed and negative except as noted above.    Physical Exam     ED Triage Vitals [24 1823]   /68   Pulse 79   Resp 20   Temp 99.5 °F (37.5 °C)   Temp src Oral   SpO2 100 %   O2 Device        Current:/80   Pulse 88   Temp 99.5 °F (37.5 °C) (Oral)   Resp 18   LMP 2024 (Approximate)   SpO2 99%         Physical Exam    Constitutional: awake, alert, no sig distress  HENT: mmm, no lesions,  Neck: normal range of motion, no tenderness, supple.  Eyes:  PERRL, EOMI, conjunctiva normal, no discharge. Sclera anicteric.  Cardiovascular: rr no murmur  Respiratory: Normal breath sounds, no respiratory distress, no wheezing, no chest tenderness.  GI: Bowel sounds normal, Soft, mild epigastric tenderness, no masses, no pulsatile masses.  TOYIN with small hemorrhoids on external examination, light brown stool,  no melena or hematochezia  : No CVA tenderness.  Skin: Warm, dry, no erythema, no rash.  Musculoskeletal: Intact distal pulses, no edema, no tenderness, no cyanosis, no clubbing. Good range of motion in all major joints. No tenderness to palpation or major deformities noted. Back- No tenderness.  Neurologic: Alert & oriented x 3, normal motor function, normal sensory function, no focal deficits noted.  Psych: Calm, cooperative, nl affect        ED Course     Labs Reviewed   CBC WITH DIFFERENTIAL WITH PLATELET    Narrative:     The following orders were created for panel order CBC With Differential With Platelet.  Procedure                               Abnormality         Status                     ---------                               -----------         ------                     CBC W/ DIFFERENTIAL[[id number]]                              Final result                 Please view results for these tests on the individual orders.   COMP METABOLIC PANEL (14)   LIPASE   CBC W/ DIFFERENTIAL          ED Course as of 02/02/24 1953  ------------------------------------------------------------  Time: 02/02 1912  Comment: Hemoglobin is above baseline              MDM        38F hx as above who presents with epigastric pain, bright red blood per rectum.  On arrival vitals are stable and reassuring.  DDx: Hemorrhoidal bleeding, bleeding secondary to colon polyps, GERD/gastritis, pancreatitis    Hemoglobin above baseline, no active bleeding in the emergency department.  Suspect hemorrhoidal source of bleeding.  Will discharge on Anusol stool softeners and GI follow-up.   Return precautions and follow-up instructions were discussed with patient who voiced understanding and agreement the plan.  All questions were answered to patient satisfaction.    Medical Decision Making      Disposition and Plan     Clinical Impression:  1. Bright red blood per rectum    2. Bleeding hemorrhoid         Disposition:  Discharge  2/2/2024  7:53 pm    Follow-up:  LUIS EDUARDO TSE MD  [address] [id number] [phone]  Follow up      [facility] Emergency Department  [address] [facility] [id number] [phone]  Follow up  As needed, If symptoms worsen          Medications Prescribed:  Current Discharge Medication List        START taking these medications    Details   hydrocortisone 25 MG Rectal Suppos Place 1 suppository (25 mg total) rectally 2 (two) times daily as needed for Hemorrhoids.  Qty: 12 suppository, Refills: 0      polyethylene glycol, PEG 3350, 17 g Oral Powd Pack Take 17 g by mouth daily as needed.  Qty: 12 each, Refills: 0

## 2024-02-03 NOTE — ED QUICK NOTES
Pt states she's been having diarrhea with tarry stools for 3 days with bright red bleeding that started today. Afebrile. She says she got a colonoscopy and EGD 5 months ago resulted with gastritis and polps.

## 2024-03-20 ENCOUNTER — HOSPITAL ENCOUNTER (EMERGENCY)
Facility: HOSPITAL | Age: 39
Discharge: HOME | End: 2024-03-20
Attending: EMERGENCY MEDICINE
Payer: MEDICAID

## 2024-03-20 ENCOUNTER — APPOINTMENT (OUTPATIENT)
Dept: ULTRASOUND IMAGING | Facility: HOSPITAL | Age: 39
End: 2024-03-20
Attending: EMERGENCY MEDICINE
Payer: MEDICAID

## 2024-03-20 VITALS
OXYGEN SATURATION: 98 % | BODY MASS INDEX: 23.37 KG/M2 | RESPIRATION RATE: 18 BRPM | SYSTOLIC BLOOD PRESSURE: 97 MMHG | HEART RATE: 67 BPM | HEIGHT: 60 IN | DIASTOLIC BLOOD PRESSURE: 66 MMHG | TEMPERATURE: 97 F | WEIGHT: 119.06 LBS

## 2024-03-20 DIAGNOSIS — N83.201 CYST OF RIGHT OVARY: Primary | ICD-10-CM

## 2024-03-20 LAB
ALBUMIN SERPL-MCNC: 4.5 G/DL (ref 3.2–4.8)
ALP LIVER SERPL-CCNC: 76 U/L
ALT SERPL-CCNC: 23 U/L
ANION GAP SERPL CALC-SCNC: 8 MMOL/L (ref 0–18)
AST SERPL-CCNC: 27 U/L (ref ?–34)
BASOPHILS # BLD AUTO: 0.03 X10(3) UL (ref 0–0.2)
BASOPHILS NFR BLD AUTO: 0.5 %
BILIRUB DIRECT SERPL-MCNC: 0.2 MG/DL (ref ?–0.3)
BILIRUB SERPL-MCNC: 0.4 MG/DL (ref 0.3–1.2)
BILIRUB UR QL: NEGATIVE
BUN BLD-MCNC: 15 MG/DL (ref 9–23)
BUN/CREAT SERPL: 26.8 (ref 10–20)
CALCIUM BLD-MCNC: 9.3 MG/DL (ref 8.7–10.4)
CHLORIDE SERPL-SCNC: 106 MMOL/L (ref 98–112)
CLARITY UR: CLEAR
CO2 SERPL-SCNC: 26 MMOL/L (ref 21–32)
COLOR UR: COLORLESS
CREAT BLD-MCNC: 0.56 MG/DL
DEPRECATED RDW RBC AUTO: 37.7 FL (ref 35.1–46.3)
EGFRCR SERPLBLD CKD-EPI 2021: 120 ML/MIN/1.73M2 (ref 60–?)
EOSINOPHIL # BLD AUTO: 0.07 X10(3) UL (ref 0–0.7)
EOSINOPHIL NFR BLD AUTO: 1.1 %
ERYTHROCYTE [DISTWIDTH] IN BLOOD BY AUTOMATED COUNT: 11.8 % (ref 11–15)
GLUCOSE BLD-MCNC: 88 MG/DL (ref 70–99)
GLUCOSE UR-MCNC: NORMAL MG/DL
HCT VFR BLD AUTO: 37.4 %
HGB BLD-MCNC: 12.6 G/DL
HGB UR QL STRIP.AUTO: NEGATIVE
IMM GRANULOCYTES # BLD AUTO: 0.02 X10(3) UL (ref 0–1)
IMM GRANULOCYTES NFR BLD: 0.3 %
KETONES UR-MCNC: 10 MG/DL
LEUKOCYTE ESTERASE UR QL STRIP.AUTO: NEGATIVE
LYMPHOCYTES # BLD AUTO: 2.27 X10(3) UL (ref 1–4)
LYMPHOCYTES NFR BLD AUTO: 36.7 %
MCH RBC QN AUTO: 30 PG (ref 26–34)
MCHC RBC AUTO-ENTMCNC: 33.7 G/DL (ref 31–37)
MCV RBC AUTO: 89 FL
MONOCYTES # BLD AUTO: 0.43 X10(3) UL (ref 0.1–1)
MONOCYTES NFR BLD AUTO: 7 %
NEUTROPHILS # BLD AUTO: 3.36 X10 (3) UL (ref 1.5–7.7)
NEUTROPHILS # BLD AUTO: 3.36 X10(3) UL (ref 1.5–7.7)
NEUTROPHILS NFR BLD AUTO: 54.4 %
NITRITE UR QL STRIP.AUTO: NEGATIVE
OSMOLALITY SERPL CALC.SUM OF ELEC: 290 MOSM/KG (ref 275–295)
PH UR: 6 [PH] (ref 5–8)
PLATELET # BLD AUTO: 276 10(3)UL (ref 150–450)
POTASSIUM SERPL-SCNC: 3.7 MMOL/L (ref 3.5–5.1)
PROT SERPL-MCNC: 7.4 G/DL (ref 5.7–8.2)
PROT UR-MCNC: NEGATIVE MG/DL
RBC # BLD AUTO: 4.2 X10(6)UL
SODIUM SERPL-SCNC: 140 MMOL/L (ref 136–145)
SP GR UR STRIP: 1.01 (ref 1–1.03)
UROBILINOGEN UR STRIP-ACNC: NORMAL
WBC # BLD AUTO: 6.2 X10(3) UL (ref 4–11)

## 2024-03-20 PROCEDURE — 80076 HEPATIC FUNCTION PANEL: CPT | Performed by: EMERGENCY MEDICINE

## 2024-03-20 PROCEDURE — 81003 URINALYSIS AUTO W/O SCOPE: CPT | Performed by: EMERGENCY MEDICINE

## 2024-03-20 PROCEDURE — 99285 EMERGENCY DEPT VISIT HI MDM: CPT

## 2024-03-20 PROCEDURE — 85025 COMPLETE CBC W/AUTO DIFF WBC: CPT | Performed by: EMERGENCY MEDICINE

## 2024-03-20 PROCEDURE — 99284 EMERGENCY DEPT VISIT MOD MDM: CPT

## 2024-03-20 PROCEDURE — 80048 BASIC METABOLIC PNL TOTAL CA: CPT | Performed by: EMERGENCY MEDICINE

## 2024-03-20 PROCEDURE — 76830 TRANSVAGINAL US NON-OB: CPT | Performed by: EMERGENCY MEDICINE

## 2024-03-20 PROCEDURE — 96374 THER/PROPH/DIAG INJ IV PUSH: CPT

## 2024-03-20 PROCEDURE — 76856 US EXAM PELVIC COMPLETE: CPT | Performed by: EMERGENCY MEDICINE

## 2024-03-20 PROCEDURE — 93975 VASCULAR STUDY: CPT | Performed by: EMERGENCY MEDICINE

## 2024-03-20 RX ORDER — TRAMADOL HYDROCHLORIDE 50 MG/1
TABLET ORAL EVERY 6 HOURS PRN
Qty: 14 TABLET | Refills: 0 | Status: SHIPPED | OUTPATIENT
Start: 2024-03-20 | End: 2024-03-25

## 2024-03-20 RX ORDER — MORPHINE SULFATE 2 MG/ML
2 INJECTION, SOLUTION INTRAMUSCULAR; INTRAVENOUS ONCE
Status: COMPLETED | OUTPATIENT
Start: 2024-03-20 | End: 2024-03-20

## 2024-03-21 NOTE — ED PROVIDER NOTES
Patient Seen in: [facility] Emergency Department    History     Chief Complaint   Patient presents with    Pelvic Pain       HPI    38-year-old female presents ER with complaint of suprapubic and right pelvic pain for the past 4 days.  Patient with a past medical history of ectopic pregnancy, bilateral salpingo ectomy as well as left oophorectomy.  Patient been having increased pain for the last few days.  Patient also noted 2 days ago she had heavy bleeding.  Patient states she felt lightheaded.    History reviewed.   Past Medical History:   Diagnosis Date    Ectopic pregnancy (HCC)     Gastritis        History reviewed.   Past Surgical History:   Procedure Laterality Date    HC  SECTION LEVEL I      LAPAROSCOPY,DIAGNOSTIC Left     Pt reports removal of L tube and ovary         Medications :  (Not in a hospital admission)       History reviewed. No pertinent family history.    Smoking Status:   Social History     Socioeconomic History    Marital status: Single   Tobacco Use    Smoking status: Never     Passive exposure: Never    Smokeless tobacco: Never   Substance and Sexual Activity    Alcohol use: Not Currently    Drug use: Never       ROS  All pertinent positives for the review of systems are mentioned in the HPI  All other organ systems are reviewed and are negative.    Constitutional and vital signs reviewed.      Social History and Family History elements reviewed from today, pertinent positives to the presenting problem noted.    Physical Exam     ED Triage Vitals [24 1904]   /80   Pulse 72   Resp 18   Temp 97 °F (36.1 °C)   Temp src Temporal   SpO2 97 %   O2 Device None (Room air)       All measures to prevent infection transmission during my interaction with the patient were taken. The patient was already wearing a droplet mask on my arrival to the room. Personal protective equipment including droplet mask, eye protection, and gloves were worn throughout the duration of the  exam.  Handwashing was performed prior to and after the exam.  Stethoscope and any equipment used during my examination was cleaned with super sani-cloth germicidal wipes following the exam.     Physical Exam  Vitals and nursing note reviewed.   Constitutional:       Appearance: She is well-developed.   HENT:      Head: Normocephalic and atraumatic.      Right Ear: External ear normal.      Left Ear: External ear normal.      Nose: Nose normal.   Eyes:      Conjunctiva/sclera: Conjunctivae normal.      Pupils: Pupils are equal, round, and reactive to light.   Cardiovascular:      Rate and Rhythm: Normal rate and regular rhythm.      Heart sounds: Normal heart sounds.   Pulmonary:      Effort: Pulmonary effort is normal.      Breath sounds: Normal breath sounds.   Abdominal:      General: Bowel sounds are normal.      Palpations: Abdomen is soft.      Tenderness: There is abdominal tenderness in the right lower quadrant and suprapubic area. There is no right CVA tenderness, left CVA tenderness, guarding or rebound.   Musculoskeletal:         General: Normal range of motion.      Cervical back: Normal range of motion and neck supple.   Skin:     General: Skin is warm and dry.   Neurological:      Mental Status: She is alert and oriented to person, place, and time.      Deep Tendon Reflexes: Reflexes are normal and symmetric.   Psychiatric:         Behavior: Behavior normal.         Thought Content: Thought content normal.         Judgment: Judgment normal.         ED Course        Labs Reviewed   BASIC METABOLIC PANEL (8) - Abnormal; Notable for the following components:       Result Value    BUN/CREA Ratio 26.8 (*)     All other components within normal limits   URINALYSIS WITH CULTURE REFLEX - Abnormal; Notable for the following components:    Urine Color Colorless (*)     Ketones Urine 10 (*)     All other components within normal limits   HEPATIC FUNCTION PANEL (7) - Normal   CBC WITH DIFFERENTIAL WITH PLATELET     Narrative:     The following orders were created for panel order CBC With Differential With Platelet.                  Procedure                               Abnormality         Status                                     ---------                               -----------         ------                                     CBC W/ DIFFERENTIAL[[id number]]                              Final result                                                 Please view results for these tests on the individual orders.   CBC W/ DIFFERENTIAL         Imaging Results Available and Reviewed while in ED: US PELVIS EV W DOPPLER (CPT=76856/69109/78417)    Result Date: 3/20/2024  CONCLUSION:    No evidence of right ovarian torsion.  Postoperative changes from prior left oophorectomy.  A 2.3 cm heterogeneously echogenic right ovarian cystic lesion, which may reflect a corpus luteal cyst or less likely hemorrhagic cyst.  Recommend follow-up pelvic ultrasound in 8-12 weeks.  A 2.0 cm right ovarian cystic lesion with thin internal septation, which likely reflects a complex ovarian cyst.  Endometrial thickness of 15 mm.  Uterine volume of 112 mL.  Trace free fluid in the pelvis, which is likely physiologic.     Dictated by (CST): Gray Sanchez MD on 3/20/2024 at 8:32 PM     Finalized by (CST): Gray Sanchez MD on 3/20/2024 at 8:37 PM         ED Medications Administered:   Medications   morphINE PF 2 MG/ML injection 2 mg (2 mg Intravenous Given 3/20/24 2030)         MDM     Vitals:    03/20/24 1904   BP: 116/80   Pulse: 72   Resp: 18   Temp: 97 °F (36.1 °C)   TempSrc: Temporal   SpO2: 97%   Weight: 54 kg   Height: 152.4 cm (5')     *I personally reviewed and interpreted all ED vitals.  I also personally reviewed all labs and imaging if ordered    Pulse Ox: 97%, Room air, Normal     Monitor Interpretation:   normal sinus rhythm    Differential Diagnosis/ Diagnostic Considerations: Endometriosis, ovarian torsion, ovarian cyst,  dysmenorrhea    Medical Record Review: I personally reviewed available prior medical records for any recent pertinent discharge summaries, testing, and procedures and reviewed those reports.    Complicating Factors: The patient already has does not have any pertinent problems on file. to contribute to the complexity of this ED evaluation.    Medical Decision Making  38-year-old female presents ER with complaint of suprapubic and right pubic pain.  Patient's ultrasound shows an ovarian cyst without any torsion.  Patient's blood work and urine within normal limits.  Patient states the morphine made her pain resolved.  Patient instructed to follow-up with OB if her pain continues.  Patient given tramadol for pain as needed.    Problems Addressed:  Cyst of right ovary: acute illness or injury    Amount and/or Complexity of Data Reviewed  Independent Historian:      Details: Medical history obtained from the  because the patient is Romanian-speaking only  External Data Reviewed: labs, radiology and notes.     Details: Patient's outpatient notes reviewed.   had made notes that showed the patient had a bilateral salpingectomy as well as a left oophorectomy  Labs: ordered. Decision-making details documented in ED Course.  Radiology: ordered and independent interpretation performed. Decision-making details documented in ED Course.     Details: Ultrasound reviewed and agree with the results of the radiologist that she does show a right ovarian cyst without torsion    Risk  Prescription drug management.        Condition upon leaving the department: Stable    Disposition and Plan     Clinical Impression:  1. Cyst of right ovary        Disposition:  Discharge    Follow-up:  Aarti Aleman MD  [address] [place] [address]  [facility] [address]  [phone]    Schedule an appointment as soon as possible for a visit  If symptoms worsen      Medications Prescribed:  Current Discharge Medication List        START taking  these medications    Details   traMADol 50 MG Oral Tab Take 1-2 tablets ( mg total) by mouth every 6 (six) hours as needed.  Qty: 14 tablet, Refills: 0    Associated Diagnoses: Cyst of right ovary

## 2024-03-21 NOTE — ED INITIAL ASSESSMENT (HPI)
Patient arrives ambulatory through triage with complaints of pelvic pain x1 week, worse today.  Hx of ovary removal and endometriosis.

## 2024-03-22 ENCOUNTER — TELEPHONE (OUTPATIENT)
Dept: OBGYN CLINIC | Facility: CLINIC | Age: 39
End: 2024-03-22

## 2024-03-22 NOTE — TELEPHONE ENCOUNTER
Pt seen in ER 3/20 for pelvic pain.   US done:  Impression   CONCLUSION:     No evidence of right ovarian torsion.  Postoperative changes from prior left oophorectomy.     A 2.3 cm heterogeneously echogenic right ovarian cystic lesion, which may reflect a corpus luteal cyst or less likely hemorrhagic cyst.  Recommend follow-up pelvic ultrasound in 8-12 weeks.     A 2.0 cm right ovarian cystic lesion with thin internal septation, which likely reflects a complex ovarian cyst.     Endometrial thickness of 15 mm.  Uterine volume of 112 mL.     Trace free fluid in the pelvis, which is likely physiologic.          Pt calling for ER follow-up. Advised no openings with N due to Spring Break. Next available OB slot 4/3    To N to advise if you would like to add pt next week?

## 2024-03-29 ENCOUNTER — TELEPHONE (OUTPATIENT)
Dept: OBGYN CLINIC | Facility: CLINIC | Age: 39
End: 2024-03-29

## 2024-03-29 NOTE — TELEPHONE ENCOUNTER
#[id number] Carrie LL: Arabic    Pt calling indicating she is losing her insurance as of the 3/31. Asking to be seen tomorrow since she is in a lot of pain. Pt informed the soonest we could offer is the appt on 4/2. Pt states understanding. Requesting we cancel the appt on 4/2. Given pain precautions and ER recs. Pt states understanding.

## 2024-03-29 NOTE — TELEPHONE ENCOUNTER
Pt called (with life partner to interpret) to see if Pt could possibly be seen tomorrow 3/30 for ED f/u. Has appt scheduled for 4/02 but was notified by [facility] she will no longer have insurance after 3/31. Please call Pt at [phone] with .

## 2024-10-21 ENCOUNTER — OFFICE VISIT (OUTPATIENT)
Dept: OBGYN CLINIC | Facility: CLINIC | Age: 39
End: 2024-10-21

## 2024-10-21 ENCOUNTER — LAB ENCOUNTER (OUTPATIENT)
Dept: LAB | Facility: HOSPITAL | Age: 39
End: 2024-10-21
Attending: NURSE PRACTITIONER
Payer: MEDICAID

## 2024-10-21 VITALS — HEART RATE: 73 BPM | DIASTOLIC BLOOD PRESSURE: 72 MMHG | SYSTOLIC BLOOD PRESSURE: 105 MMHG

## 2024-10-21 DIAGNOSIS — N92.0 MENORRHAGIA WITH REGULAR CYCLE: ICD-10-CM

## 2024-10-21 DIAGNOSIS — N83.201 CYST OF RIGHT OVARY: Primary | ICD-10-CM

## 2024-10-21 LAB
DEPRECATED HBV CORE AB SER IA-ACNC: 49 NG/ML
DEPRECATED RDW RBC AUTO: 39.5 FL (ref 35.1–46.3)
ERYTHROCYTE [DISTWIDTH] IN BLOOD BY AUTOMATED COUNT: 12 % (ref 11–15)
HCT VFR BLD AUTO: 38.9 %
HGB BLD-MCNC: 13 G/DL
IRON SATN MFR SERPL: 14 %
IRON SERPL-MCNC: 53 UG/DL
MCH RBC QN AUTO: 29.8 PG (ref 26–34)
MCHC RBC AUTO-ENTMCNC: 33.4 G/DL (ref 31–37)
MCV RBC AUTO: 89.2 FL
PLATELET # BLD AUTO: 311 10(3)UL (ref 150–450)
RBC # BLD AUTO: 4.36 X10(6)UL
TIBC SERPL-MCNC: 367 UG/DL (ref 250–425)
TRANSFERRIN SERPL-MCNC: 246 MG/DL (ref 250–380)
TSI SER-ACNC: 0.91 MIU/ML (ref 0.55–4.78)
WBC # BLD AUTO: 5.1 X10(3) UL (ref 4–11)

## 2024-10-21 PROCEDURE — 84443 ASSAY THYROID STIM HORMONE: CPT | Performed by: NURSE PRACTITIONER

## 2024-10-21 PROCEDURE — 85027 COMPLETE CBC AUTOMATED: CPT

## 2024-10-21 PROCEDURE — 83540 ASSAY OF IRON: CPT

## 2024-10-21 PROCEDURE — 82728 ASSAY OF FERRITIN: CPT

## 2024-10-21 PROCEDURE — 36415 COLL VENOUS BLD VENIPUNCTURE: CPT | Performed by: NURSE PRACTITIONER

## 2024-10-21 PROCEDURE — 84466 ASSAY OF TRANSFERRIN: CPT

## 2024-10-21 RX ORDER — SUMATRIPTAN 25 MG/1
25 TABLET, FILM COATED ORAL AS NEEDED
COMMUNITY

## 2024-10-21 NOTE — PROGRESS NOTES
[facility]   Obstetrics and Gynecology    Donita Rivera is a 38 year old female  Patient's last menstrual period was 10/07/2024 (exact date).   Chief Complaint   Patient presents with    Consult     Discuss NEXPLANON or IUD insertion // Discuss ovarian cyst and heavy periods    Patient here for gyne concern.  Last seen 2023 with Dr. Aleman s/p ectopic pregnancy- had laparoscopic Removal Ectopic pregnancy with partial left salpingectomy and right salpingectomy     She reports had an ovarian cyst on last ultrasound in 3/2024. See below. No pain today. Repeat ultrasound ordered.    Her doctor in [place] told her to get an implant to help with heavy periods and with cysts.    Periods are regular, menses last 3 days, heavy, changes 3-4 pads every hour. She also has blood clots that she sees - quarter size. She reports periods have been heavier since her ectopic pregnancy.    She is sexually active,     Hx of migraines prior to period or sometimes after. Takes sumatriptan. No visual changes or aura.    Pap:unsure of last pap  No history of abnormal paps  Contraception: s/p tubal      PACS Images     Show images for US PELVIS EV W DOPPLER (CPT=76856/46144/30842)  Study Result    Narrative   PROCEDURE: US PELVIS EV W DOPPLER (CPT=76856/16759/69642)     COMPARISON: [facility], US PELVIS EV W DOPPLER (CPT=76856/40161/54145), 3/25/2023, 6:52 PM.     INDICATIONS: right pelvic and suprapubic pain,     TECHNIQUE: Pelvic ultrasound using transabdominal and transvaginal technique. Duplex/color Doppler evaluation of the ovaries for torsion.     FINDINGS:  UTERUS:   Size is 7.6 x 4.6 x 6.1 cm.  The uterine volume is 112 mL. The uterus is retroverted.  The cervix is closed.     ENDOMETRIUM: Endometrial thickness is 15 mm.  No fluid or mass in the endometrial canal.    MYOMETRIUM: Normal echogenicity.  No masses.       OVARIES AND ADNEXA:     RIGHT:   The right ovary measures 4.3 x 2.4 x 3.1 cm.  There  is a 2.0 x 1.3 x 2.0 cm right ovarian cystic lesion with an internal septation.  There is a heterogeneous 2.3 cm right ovarian lesion.  This lesion demonstrates echogenicity similar to that of  the adjacent ovarian parenchyma  LEFT:   The left ovary is not identified, compatible with prior left oophorectomy.     DOPPLER:   Normal spectral analysis, arterial and venous waveforms and color flow in the right ovary  CUL-DE-SAC:   Small volume of free fluid in the pelvis.  OTHER:   Negative.  Bladder appears normal.             Impression   CONCLUSION:     No evidence of right ovarian torsion.  Postoperative changes from prior left oophorectomy.     A 2.3 cm heterogeneously echogenic right ovarian cystic lesion, which may reflect a corpus luteal cyst or less likely hemorrhagic cyst.  Recommend follow-up pelvic ultrasound in 8-12 weeks.     A 2.0 cm right ovarian cystic lesion with thin internal septation, which likely reflects a complex ovarian cyst.     Endometrial thickness of 15 mm.  Uterine volume of 112 mL.     Trace free fluid in the pelvis, which is likely physiologic.             Dictated by (CST): Gray Sanchez MD on 3/20/2024 at 8:32 PM      Finalized by (CST): Gray Sanchez MD on 3/20/2024 at 8:37 PM       OBSTETRICS HISTORY:  OB History    Para Term  AB Living   2 1 1 0 1 2   SAB IAB Ectopic Multiple Live Births   0 0 1 1 2       GYNE HISTORY:  Period Cycle (Days): Monthly (10/21/2024  9:52 AM)  Period Duration (Days): 3 days (10/21/2024  9:52 AM)  Period Flow: Very heavy (10/21/2024  9:52 AM)  Use of Birth Control (if yes, specify type): None (10/21/2024  9:52 AM)  Pap Result Notes: Last pap about a year ago per pt (10/21/2024  9:52 AM)      History   Sexual Activity    Sexual activity: Not on file       MEDICAL HISTORY:  Past Medical History:    Ectopic pregnancy (HCC)    Gastritis       SOCIAL HISTORY:  Social History     Socioeconomic History    Marital status: Single     Spouse name:  Not on file    Number of children: Not on file    Years of education: Not on file    Highest education level: Not on file   Occupational History    Not on file   Tobacco Use    Smoking status: Never     Passive exposure: Never    Smokeless tobacco: Never   Substance and Sexual Activity    Alcohol use: Not Currently    Drug use: Never    Sexual activity: Not on file   Other Topics Concern    Not on file   Social History Narrative    Not on file     Social Drivers of Health     Financial Resource Strain: Not on File (11/4/2022)    [facility]    Financial Resource Strain     Financial Resource Strain: 0   Food Insecurity: Not on File (9/26/2024)    Received from [facility]    Food Insecurity     Food: 0   Transportation Needs: Not on File (11/4/2022)    Received from [facility]    Transportation Needs     Transportation: 0   Physical Activity: Not on File (11/4/2022)    Received from [facility]    Physical Activity     Physical Activity: 0   Stress: Not on File (11/4/2022)    Received from [facility]    Stress     Stress: 0   Social Connections: Not on File (9/25/2024)    Received from [facility]    Social Connections     Connectedness: 0   Housing Stability: At Risk (8/18/2023)    Received from [facility]    [facility] Housing     Living Situation: Not on file     Housing Problems: Not on file       MEDICATIONS:    Current Outpatient Medications:     SUMAtriptan 25 MG Oral Tab, Take 1 tablet (25 mg total) by mouth as needed. MAY REPEAT IN 2 HRS, Disp: , Rfl:     omeprazole 20 MG Oral Capsule Delayed Release, Take 1 capsule (20 mg total) by mouth every morning before breakfast. (Patient not taking: Reported on 10/21/2024), Disp: , Rfl:     ibuprofen 600 MG Oral Tab, Take 1 tablet (600 mg total) by mouth every 6 (six) hours as needed for Pain. (Patient not taking: Reported on 10/21/2024), Disp: 30 tablet, Rfl: 0    ALLERGIES:  Allergies[1]      Review of Systems:  Constitutional:  Denies fatigue,  night sweats, hot flashes  Cardiovascular:  denies chest pain or palpitations  Respiratory:  denies shortness of breath  Gastrointestinal:  denies heartburn, abdominal pain, diarrhea or constipation  Genitourinary:  denies dysuria, incontinence, abnormal vaginal discharge, vaginal itching +heavy periods, menstrual ,+migraine  Musculoskeletal:  denies back pain.  Skin/Breast:  Denies any breast pain, lumps, or discharge.   Neurological:  denies headaches, extremity weakness or numbness.  Psychiatric: denies depression or anxiety.  Endocrine:   denies excessive thirst or urination.  Heme/Lymph:  denies history of anemia, easy bruising or bleeding.      PHYSICAL EXAM:     Vitals:    10/21/24 0953   BP: 105/72   Pulse: 73     There is no height or weight on file to calculate BMI.     Patient offered chaperone, patient declined    Constitutional: well developed, well nourished    Psychiatric:  Oriented to time, place, person and situation. Appropriate mood and affect    Assessment & Plan:  Donita was seen today for consult.    Diagnoses and all orders for this visit:    Cyst of right ovary  -     US PELVIS W EV (CPT=76856/01139); Future    Menorrhagia with regular cycle  -     TSH W Reflex To Free T4  -     CBC, Platelet; No Differential; Future  -     Ferritin; Future  -     Iron And Tibc; Future    Reviewed options for heavy periods and ovarian cysts.  Discussed nexplanon with patient - however given abnormal uterine bleeding with implant cannot guarantee this would help with her heavy periods.  We did discuss IUD or depo or OCPs, nuvaring or patch.  Reviewed risks benefits and SE with all options.  Desires depo provera - will start with next period - call office to start  Labs and ultrasound ordered  Rto 1 month for annual and pap    All questions answered     Anny #[id number] used.    NOLAN Johnson    Spent total time 30 minutes on obtaining history / chart review, evaluating patient /  performing medically appropriate exam, discussing treatment options, counseling / educating, and completing documentation, coordinating care.         [1] No Known Allergies

## 2024-11-01 ENCOUNTER — TELEPHONE (OUTPATIENT)
Dept: OBGYN CLINIC | Facility: CLINIC | Age: 39
End: 2024-11-01

## 2024-11-01 NOTE — TELEPHONE ENCOUNTER
Patient states she was told to call for an appointment on the 1st day of her period for the birth control shot. Her period started today

## 2024-11-01 NOTE — TELEPHONE ENCOUNTER
Language Line used: Martiniquais  Interpretor: Luc #[id number]     Patient calling with first day of a full flow and would like to schedule Depo Provera injection tomorrow at 11 am.

## 2024-11-02 ENCOUNTER — NURSE ONLY (OUTPATIENT)
Dept: OBGYN CLINIC | Facility: CLINIC | Age: 39
End: 2024-11-02

## 2024-11-02 VITALS
BODY MASS INDEX: 25 KG/M2 | SYSTOLIC BLOOD PRESSURE: 110 MMHG | WEIGHT: 128 LBS | HEART RATE: 76 BPM | DIASTOLIC BLOOD PRESSURE: 77 MMHG

## 2024-11-02 DIAGNOSIS — Z30.42 DEPO-PROVERA CONTRACEPTIVE STATUS: Primary | ICD-10-CM

## 2024-11-02 PROCEDURE — 96372 THER/PROPH/DIAG INJ SC/IM: CPT | Performed by: NURSE PRACTITIONER

## 2024-11-02 RX ORDER — MEDROXYPROGESTERONE ACETATE 150 MG/ML
150 INJECTION, SUSPENSION INTRAMUSCULAR
Status: SHIPPED | OUTPATIENT
Start: 2024-11-02

## 2024-11-02 RX ADMIN — MEDROXYPROGESTERONE ACETATE 150 MG: 150 INJECTION, SUSPENSION INTRAMUSCULAR at 11:17:00

## 2024-11-02 NOTE — PROGRESS NOTES
Patient came in today for depo. Patient received dose in RIGHT gluteus, tolerated well. Next appointment to be between 1/18/25-2/1/25. Patient verbalized understanding.     Last annual: 10-21-24 Deanna Tipton

## 2024-11-22 ENCOUNTER — OFFICE VISIT (OUTPATIENT)
Dept: OBGYN CLINIC | Facility: CLINIC | Age: 39
End: 2024-11-22

## 2024-11-22 VITALS
SYSTOLIC BLOOD PRESSURE: 121 MMHG | DIASTOLIC BLOOD PRESSURE: 76 MMHG | BODY MASS INDEX: 25 KG/M2 | HEART RATE: 75 BPM | WEIGHT: 128.63 LBS

## 2024-11-22 DIAGNOSIS — Z23 NEED FOR HPV VACCINATION: ICD-10-CM

## 2024-11-22 DIAGNOSIS — Z12.4 SCREENING FOR CERVICAL CANCER: ICD-10-CM

## 2024-11-22 DIAGNOSIS — Z01.419 WELL WOMAN EXAM WITH ROUTINE GYNECOLOGICAL EXAM: Primary | ICD-10-CM

## 2024-11-22 PROCEDURE — 99395 PREV VISIT EST AGE 18-39: CPT | Performed by: NURSE PRACTITIONER

## 2024-11-22 PROCEDURE — 90651 9VHPV VACCINE 2/3 DOSE IM: CPT | Performed by: NURSE PRACTITIONER

## 2024-11-22 PROCEDURE — 90471 IMMUNIZATION ADMIN: CPT | Performed by: NURSE PRACTITIONER

## 2024-11-22 RX ORDER — DROSPIRENONE 4 MG/1
1 TABLET, FILM COATED ORAL DAILY
Qty: 84 TABLET | Refills: 3 | Status: SHIPPED | OUTPATIENT
Start: 2024-11-22 | End: 2025-11-22

## 2024-11-22 NOTE — PROGRESS NOTES
VIS (Kinyarwanda) provided. Human papillomavirus #1 given to left delt per patient preference. Used LL to assist- notified to return in 1-2 months for dose #2. Patient verbalized understanding.

## 2024-11-25 LAB — HPV E6+E7 MRNA CVX QL NAA+PROBE: NEGATIVE

## 2024-11-27 ENCOUNTER — HOSPITAL ENCOUNTER (OUTPATIENT)
Dept: ULTRASOUND IMAGING | Facility: HOSPITAL | Age: 39
Discharge: HOME | End: 2024-11-27
Attending: NURSE PRACTITIONER
Payer: MEDICAID

## 2024-11-27 DIAGNOSIS — N83.201 CYST OF RIGHT OVARY: ICD-10-CM

## 2024-11-27 PROCEDURE — 76856 US EXAM PELVIC COMPLETE: CPT | Performed by: NURSE PRACTITIONER

## 2024-11-27 PROCEDURE — 76830 TRANSVAGINAL US NON-OB: CPT | Performed by: NURSE PRACTITIONER

## 2024-12-04 ENCOUNTER — TELEPHONE (OUTPATIENT)
Dept: OBGYN CLINIC | Facility: CLINIC | Age: 39
End: 2024-12-04

## 2024-12-06 NOTE — TELEPHONE ENCOUNTER
The IUD is an option for heavy periods but would not suppress ovulation and prevent ovarian cysts. Slynd is good option for both heavy periods and to suppress ovulation to prevent ovarian cyst.

## 2024-12-06 NOTE — TELEPHONE ENCOUNTER
ANTHONY used-  Marquita ID #[id number].    Last annual 11/22/24 with Deanna Tipton.    Patient informed of Deanna Tipton recommendations and verbalized understanding. Patient states she is interested in an IUD, as she may forget about taking pills on a daily basis (she would be due to start taking Slynd on 1/18/25).    Patient is very interested in IUD insertion and is wondering if she can schedule an appointment for insertion. Patient states she briefly discussed this with Deanna during her annual on 11/22, but pt was indecisive at the time. Advised patient that there are several different types of IUDs with different hormone dosing, so Deanna may want to have another discussion with her prior to scheduling an insertion.    Message to Deanna Tipton to please review and advise on IUD, thank you.

## 2025-01-31 ENCOUNTER — TELEPHONE (OUTPATIENT)
Dept: OBGYN CLINIC | Facility: CLINIC | Age: 40
End: 2025-01-31

## 2025-01-31 NOTE — TELEPHONE ENCOUNTER
ANTHONY used-  Sarah ID #[id number].    Last annual/ gardasil #1 vaccine administered on 11/22/24.    Patient accepts appointment for gardasil #2 for tomorrow, 2/1/25.

## 2025-02-01 ENCOUNTER — NURSE ONLY (OUTPATIENT)
Dept: OBGYN CLINIC | Facility: CLINIC | Age: 40
End: 2025-02-01
Payer: MEDICAID

## 2025-02-01 VITALS
BODY MASS INDEX: 25 KG/M2 | HEART RATE: 76 BPM | SYSTOLIC BLOOD PRESSURE: 89 MMHG | WEIGHT: 127.19 LBS | DIASTOLIC BLOOD PRESSURE: 61 MMHG

## 2025-02-01 PROCEDURE — 90651 9VHPV VACCINE 2/3 DOSE IM: CPT | Performed by: NURSE PRACTITIONER

## 2025-02-01 PROCEDURE — 90471 IMMUNIZATION ADMIN: CPT | Performed by: NURSE PRACTITIONER

## 2025-02-01 NOTE — H&P
PT CAME IN FOR SECOND DOSE OF HUMAN PAPILLOMAVIRUS GIVEN TO IN RIGHT GLUTE. USED LL TO ASSIST AND NOTIFY PT TO RETURN IN JUNE FOR THIRD DOSE   LAST ANNUAL 11/22/24 EMB

## (undated) DEVICE — COVER SGL STRL LGHT HNDL BLU

## (undated) DEVICE — SOL NACL IRRIG 0.9% 1000ML BTL

## (undated) DEVICE — DISPOSABLE SUCTION/IRRIGATOR TUBE SET: Brand: AHTO

## (undated) DEVICE — TISSUE RETRIEVAL SYSTEM: Brand: INZII RETRIEVAL SYSTEM

## (undated) DEVICE — ARISTA AH ABSORBABLE HEMOSTATIC PARTICLES: Brand: ARISTA™ AH

## (undated) DEVICE — LAPAROSCOPY: Brand: MEDLINE INDUSTRIES, INC.

## (undated) DEVICE — ENSEAL X1 TISSUE SEALER, CURVED JAW, 37 CM SHAFT LENGTH: Brand: ENSEAL

## (undated) DEVICE — [HIGH FLOW INSUFFLATOR,  DO NOT USE IF PACKAGE IS DAMAGED,  KEEP DRY,  KEEP AWAY FROM SUNLIGHT,  PROTECT FROM HEAT AND RADIOACTIVE SOURCES.]: Brand: PNEUMOSURE

## (undated) DEVICE — SOLUTION  .9 3000ML

## (undated) DEVICE — TROCAR: Brand: KII FIOS FIRST ENTRY

## (undated) DEVICE — TROCAR: Brand: KII SLEEVE

## (undated) DEVICE — ARISTA AH FLEXITIP XL APPLICATOR: Brand: ARISTA™ AH FLEXITIP™ XL

## (undated) DEVICE — 3M™ STERI-DRAPE™ INSTRUMENT POUCH 1018L: Brand: STERI-DRAPE™

## (undated) DEVICE — UNDYED BRAIDED (POLYGLACTIN 910), SYNTHETIC ABSORBABLE SUTURE: Brand: COATED VICRYL

## (undated) DEVICE — INSUFFLATION NEEDLE TO ESTABLISH PNEUMOPERITONEUM.: Brand: INSUFFLATION NEEDLE

## (undated) DEVICE — CONTAINER,SPECIMEN,OR STERILE,4OZ: Brand: MEDLINE

## (undated) DEVICE — ENCORE® LATEX MICRO SIZE 6.5, STERILE LATEX POWDER-FREE SURGICAL GLOVE: Brand: ENCORE

## (undated) NOTE — LETTER
4/10/2023              [address]        [place] [phone]         To whom it may concern,   Patient Lam Muller is currently under my medical care and was seen in my office today, she is medically cleared to return to work, if any other questions please feel free to contact my office.       Sincerely,    Warden Tiny MD  [facility], [facility], [facility] - OB/GYN  [facility] [id number] [phone]        Document electronically generated by:

## (undated) NOTE — LETTER
VACCINE ADMINISTRATION RECORD  PARENT / GUARDIAN APPROVAL  Date: 2024  Vaccine administered to: Donita Rivera     : 1985    MRN: [id number]    A copy of the appropriate Centers for Disease Control and Prevention Vaccine Information statement has been provided. I have read or have had explained the information about the diseases and the vaccines listed below. There was an opportunity to ask questions and any questions were answered satisfactorily. I believe that I understand the benefits and risks of the vaccine cited and ask that the vaccine(s) listed below be given to me or to the person named above (for whom I am authorized to make this request).    VACCINES ADMINISTERED:  Gardasil    I have read and hereby agree to be bound by the terms of this agreement as stated above. My signature is valid until revoked by me in writing.  This document is signed by Donita Rivera , relationship: Self on 2024.:                                                                                                                                         Parent / Guardian Signature                                                Date    Judie BLEVINS RN served as a witness to authentication that the identity of the person signing electronically is in fact the person represented as signing.

## (undated) NOTE — LETTER
VACCINE ADMINISTRATION RECORD  PARENT / GUARDIAN APPROVAL  Date: 2025  Vaccine administered to: Donita Rivera     : 1985    MRN: [id number]    A copy of the appropriate Centers for Disease Control and Prevention Vaccine Information statement has been provided. I have read or have had explained the information about the diseases and the vaccines listed below. There was an opportunity to ask questions and any questions were answered satisfactorily. I believe that I understand the benefits and risks of the vaccine cited and ask that the vaccine(s) listed below be given to me or to the person named above (for whom I am authorized to make this request).    VACCINES ADMINISTERED:  Gardasil      I have read and hereby agree to be bound by the terms of this agreement as stated above. My signature is valid until revoked by me in writing.  This document is signed by SELF, relationship: Self on 2025.:                                                                                                                                         Parent / Guardian Signature                                                Date    Gorge ZHAO served as a witness to authentication that the identity of the person signing electronically is in fact the person represented as signing.    This document was generated by Gorge ZHAO on 2025.